# Patient Record
Sex: MALE
[De-identification: names, ages, dates, MRNs, and addresses within clinical notes are randomized per-mention and may not be internally consistent; named-entity substitution may affect disease eponyms.]

---

## 2022-11-21 ENCOUNTER — NURSE TRIAGE (OUTPATIENT)
Dept: OTHER | Facility: CLINIC | Age: 32
End: 2022-11-21

## 2022-11-21 NOTE — TELEPHONE ENCOUNTER
Location of patient: [place]    Subjective: Caller states \"I got COVID when I was down in [place]. I waited the five days after symptom onset. It's been a few days since I had a fever. Am I okay to be around my family? \"     Recommended disposition: [facility] [place] 7875 advice provided, patient verbalizes understanding; denies any other questions or concerns; instructed to call back for any new or worsening symptoms. Gave pt info from [facility] and encouraged high quality mask and very good hygene all around the house. This triage is a result of a call to [address] [place] [address]. Please do not respond to the triage nurse through this encounter. Any subsequent communication should be directly with the patient. Reason for Disposition   General information question, no triage required and triager able to answer question    Protocols used:  Information Only Call - No Triage-[facility]

## 2022-11-21 NOTE — TELEPHONE ENCOUNTER
Location of patient: [address] only - no triage    Subjective: Caller states \"I have had Covid and wanted to see when I could expose my daughter\"     Current Symptoms: Covid symptoms are improving - no fever for 3 days - symptoms began 6 days ago - patient advised that he could break quarantine after five days and fever free for 24 hours    Onset: 6 days ago; Recommended disposition: [place] 0518 advice provided, patient verbalizes understanding; denies any other questions or concerns; instructed to call back for any new or worsening symptoms. Patient agrees with home care and will carefully monitor symptoms    This triage is a result of a call to [address] [place] [address]. Please do not respond to the triage nurse through this encounter. Any subsequent communication should be directly with the patient. Reason for Disposition   Information only question and nurse able to answer    Answer Assessment - Initial Assessment Questions  1. REASON FOR CALL or QUESTION: \"What is your reason for calling today? \" or \"How can I best help you? \" or \"What question do you have that I can help answer? \"      See note above    Protocols used:  Information Only Call - No Triage-[facility]